# Patient Record
Sex: FEMALE | Race: BLACK OR AFRICAN AMERICAN | Employment: FULL TIME | URBAN - METROPOLITAN AREA
[De-identification: names, ages, dates, MRNs, and addresses within clinical notes are randomized per-mention and may not be internally consistent; named-entity substitution may affect disease eponyms.]

---

## 2018-09-20 PROCEDURE — 86021 WBC ANTIBODY IDENTIFICATION: CPT | Performed by: INTERNAL MEDICINE

## 2018-09-20 PROCEDURE — 88184 FLOWCYTOMETRY/ TC 1 MARKER: CPT | Performed by: INTERNAL MEDICINE

## 2018-09-20 PROCEDURE — 86803 HEPATITIS C AB TEST: CPT | Performed by: INTERNAL MEDICINE

## 2018-09-20 PROCEDURE — 88185 FLOWCYTOMETRY/TC ADD-ON: CPT | Performed by: INTERNAL MEDICINE

## 2018-09-20 PROCEDURE — 87389 HIV-1 AG W/HIV-1&-2 AB AG IA: CPT | Performed by: INTERNAL MEDICINE

## 2018-10-04 ENCOUNTER — APPOINTMENT (OUTPATIENT)
Dept: HEMATOLOGY/ONCOLOGY | Age: 63
End: 2018-10-04
Attending: INTERNAL MEDICINE
Payer: COMMERCIAL

## 2018-12-17 PROBLEM — D70.8 CHRONIC BENIGN NEUTROPENIA (HCC): Status: ACTIVE | Noted: 2018-12-17

## 2018-12-17 PROBLEM — D70.8 CHRONIC BENIGN NEUTROPENIA: Status: ACTIVE | Noted: 2018-12-17

## 2021-10-30 ENCOUNTER — HOSPITAL ENCOUNTER (EMERGENCY)
Age: 66
Discharge: HOME | End: 2021-10-30
Attending: EMERGENCY MEDICINE
Payer: COMMERCIAL

## 2021-10-30 VITALS
SYSTOLIC BLOOD PRESSURE: 147 MMHG | DIASTOLIC BLOOD PRESSURE: 80 MMHG | BODY MASS INDEX: 22.5 KG/M2 | HEART RATE: 89 BPM | OXYGEN SATURATION: 100 % | TEMPERATURE: 99 F | HEIGHT: 66 IN | RESPIRATION RATE: 16 BRPM | WEIGHT: 140 LBS

## 2021-10-30 DIAGNOSIS — T15.92XA FB EYE, LEFT, INITIAL ENCOUNTER: Primary | ICD-10-CM

## 2021-10-30 DIAGNOSIS — S05.02XA ABRASION OF LEFT CORNEA, INITIAL ENCOUNTER: ICD-10-CM

## 2021-10-30 PROCEDURE — 99283 EMERGENCY DEPT VISIT LOW MDM: CPT

## 2021-10-30 PROCEDURE — 65222 REMOVE FOREIGN BODY FROM EYE: CPT

## 2021-10-30 RX ORDER — TOBRAMYCIN 3 MG/ML
2 SOLUTION/ DROPS OPHTHALMIC EVERY 4 HOURS
Qty: 1 EACH | Refills: 0 | Status: SHIPPED | OUTPATIENT
Start: 2021-10-30

## 2021-10-30 NOTE — ED PROVIDER NOTES
Patient Seen in: [facility] Emergency Department In [place]      History   Patient presents with:   Eye Visual Problem  Laceration/Abrasion    Stated Complaint: glass in l hand and l eye after breaking a jar     Subjective:   24-year-old female who present air)       Current:/80   Pulse 89   Temp 98.5 °F (36.9 °C) (Temporal)   Resp 16   Ht 167.6 cm (5' 6\")   Wt 63.5 kg   SpO2 100%   BMI 22.60 kg/m²         Physical Exam  Vitals and nursing note reviewed.    Constitutional:       Appearance: Normal appe agreement Mike Burgos plan and disposition. [facility]      Foreign body was successfully removed from the left lower eyelid. Slit-lamp exam was done a corneal abrasion is noted. Patient was placed on tobramycin.   Patient advised Tylenol and Motrin as neede

## 2021-10-30 NOTE — ED PROVIDER NOTES
24-year-old female that was seen by the nurse practitioner. We did discuss the case and discussed care and treatment. Patient was discharged prior to my evaluation.

## 2025-03-18 ENCOUNTER — LAB ENCOUNTER (OUTPATIENT)
Dept: LAB | Age: 70
End: 2025-03-18
Attending: NURSE PRACTITIONER
Payer: MEDICARE

## 2025-03-18 ENCOUNTER — OFFICE VISIT (OUTPATIENT)
Dept: INTERNAL MEDICINE CLINIC | Facility: CLINIC | Age: 70
End: 2025-03-18
Payer: MEDICARE

## 2025-03-18 VITALS
OXYGEN SATURATION: 98 % | HEIGHT: 65.35 IN | BODY MASS INDEX: 23.48 KG/M2 | TEMPERATURE: 98 F | HEART RATE: 73 BPM | DIASTOLIC BLOOD PRESSURE: 80 MMHG | RESPIRATION RATE: 16 BRPM | SYSTOLIC BLOOD PRESSURE: 114 MMHG | WEIGHT: 142.63 LBS

## 2025-03-18 DIAGNOSIS — Z11.3 SCREENING FOR STDS (SEXUALLY TRANSMITTED DISEASES): ICD-10-CM

## 2025-03-18 DIAGNOSIS — N94.10 DYSPAREUNIA IN FEMALE: ICD-10-CM

## 2025-03-18 DIAGNOSIS — N76.1 CHRONIC VAGINITIS: Primary | ICD-10-CM

## 2025-03-18 DIAGNOSIS — L29.0 RECTAL ITCHING: ICD-10-CM

## 2025-03-18 DIAGNOSIS — N76.1 CHRONIC VAGINITIS: ICD-10-CM

## 2025-03-18 LAB
HBV CORE AB SERPL QL IA: NONREACTIVE
HBV SURFACE AG SER-ACNC: 0.18 [IU]/L
HBV SURFACE AG SERPL QL IA: NONREACTIVE
HCV AB SERPL QL IA: NONREACTIVE
T PALLIDUM AB SER QL IA: NONREACTIVE

## 2025-03-18 PROCEDURE — 87591 N.GONORRHOEAE DNA AMP PROB: CPT | Performed by: NURSE PRACTITIONER

## 2025-03-18 PROCEDURE — 86704 HEP B CORE ANTIBODY TOTAL: CPT

## 2025-03-18 PROCEDURE — 87491 CHLMYD TRACH DNA AMP PROBE: CPT | Performed by: NURSE PRACTITIONER

## 2025-03-18 PROCEDURE — 81514 NFCT DS BV&VAGINITIS DNA ALG: CPT | Performed by: NURSE PRACTITIONER

## 2025-03-18 PROCEDURE — 87340 HEPATITIS B SURFACE AG IA: CPT

## 2025-03-18 PROCEDURE — 86780 TREPONEMA PALLIDUM: CPT

## 2025-03-18 PROCEDURE — 87389 HIV-1 AG W/HIV-1&-2 AB AG IA: CPT

## 2025-03-18 PROCEDURE — 36415 COLL VENOUS BLD VENIPUNCTURE: CPT

## 2025-03-18 PROCEDURE — 99204 OFFICE O/P NEW MOD 45 MIN: CPT | Performed by: NURSE PRACTITIONER

## 2025-03-18 PROCEDURE — 86803 HEPATITIS C AB TEST: CPT

## 2025-03-18 RX ORDER — CLOTRIMAZOLE AND BETAMETHASONE DIPROPIONATE 10; .64 MG/G; MG/G
1 CREAM TOPICAL 2 TIMES DAILY PRN
Qty: 60 G | Refills: 0 | Status: SHIPPED | OUTPATIENT
Start: 2025-03-18

## 2025-03-18 RX ORDER — HYDROCORTISONE 25 MG/G
1 CREAM TOPICAL 2 TIMES DAILY
Qty: 30 G | Refills: 0 | Status: SHIPPED | OUTPATIENT
Start: 2025-03-18

## 2025-03-18 NOTE — PROGRESS NOTES
[facility]    CHIEF COMPLAINT:   Chief Complaint   Patient presents with    Vaginal Problem     Going on for a long time - months+    Extreme Itching Vaginal/Anal, Painful Sex Recently, noticed 2 Bumps around vaginal area         HISTORY OF PRESENT ILLNESS: The patient is a 70 year old year old female new to the office who presents with acute concerns. Has not seen a PCP but sees functional medicine MD, Dr. Kirill Cummins.     Diet and exercise are good. No alcohol use. No smoking. She is  and has 6 children and 6 grandchildren. She is a retired teacher.     Has had chronic vaginal itching on and off from the past 30+ years since being . However, has noticed increased vaginal itching, rectal itching, and pain during sexual intercourse from the past 2-3 months. She did notice some \"bumps\" to the vaginal area previously, none present now. Denies any vaginal discharge, pelvic pain, abdominal pain, rashes, or lesions to the genital area. Denies any constipation, diarrhea, hemorrhoids, or blood in stool. She is sexually active with her  only, no concerns for STDs per patient. Mentions that her  recently told her he gets bumps and itching on the penis area which resolves on its own. He will also be making an appointment for further workup/testing with his PCP. She has been tested for HIV in 2018 which was negative. She has not had any other testing/workup for this. Used to see gyne years ago when younger, has not seen anyone recently. She has tried home remedy such as castor oil, apple cider vinegar, and coconut oil without much relief.     Past medical, surgical, social, and family history reviewed.     Past Medical History:   History reviewed. No pertinent past medical history.     Past Surgical History:   Past Surgical History:   Procedure Laterality Date          x2    D & c         Current Medications:    Current Outpatient Medications   Medication Sig Dispense Refill     clotrimazole-betamethasone 1-0.05 % External Cream Apply 1 Application topically 2 (two) times daily as needed. 60 g 0    hydrocortisone 2.5 % External Cream Place 1 Application rectally 2 (two) times daily. 30 g 0        Allergies:    Allergies as of 03/18/2025 - Review Complete 03/18/2025   Allergen Reaction Noted    Penicillins OTHER (SEE COMMENTS) 09/20/2018       SOCIAL HISTORY:   Social History     Socioeconomic History    Marital status:      Spouse name: Not on file    Number of children: Not on file    Years of education: Not on file    Highest education level: Not on file   Occupational History    Not on file   Tobacco Use    Smoking status: Never     Passive exposure: Never    Smokeless tobacco: Never   Vaping Use    Vaping status: Never Used   Substance and Sexual Activity    Alcohol use: No    Drug use: No    Sexual activity: Yes     Partners: Male   Other Topics Concern    Caffeine Concern No    Exercise Yes    Seat Belt Yes    Special Diet Yes    Stress Concern No    Weight Concern No   Social History Narrative    Not on file     Social Drivers of Health     Food Insecurity: Not on file   Transportation Needs: Not on file   Stress: Not on file   Housing Stability: Not on file       FAMILY HISTORY:   Family History   Problem Relation Age of Onset    No Known Problems Mother     No Known Problems Father     No Known Problems Sister     No Known Problems Brother     Diabetes Maternal Grandmother     No Known Problems Maternal Grandfather     No Known Problems Paternal Grandmother     No Known Problems Paternal Grandfather        REVIEW OF SYSTEMS:  GENERAL: feels well otherwise  SKIN: denies any unusual skin lesions  EYES: denies blurred vision or double vision  HEENT: denies nasal congestion, sinus pain or ST  LUNGS: denies shortness of breath with exertion  CARDIOVASCULAR: denies chest pain on exertion  GI: denies abdominal pain, denies heartburn  : see HPI  MUSCULOSKELETAL: denies back  pain  NEURO: denies headaches  PSYCHE: denies depression or anxiety   HEMATOLOGIC: denies hx of anemia  ENDOCRINE: denies thyroid history  ALL/ASTHMA: hx of multiple \"food\" allergy per patient, no asthma    PAIN ASSESSMENT (Patient reports Pain): Yes       FALL ASSESSMENT:    Falls in the last 12 months: No    FUNCTIONAL ASSESSMENT (Able to perform all ADLs): Yes    BODY HABITUS AND NUTRITION STATUS: Body mass index is 23.47 kg/m².    PHYSICAL EXAM:   /80 (BP Location: Left arm, Patient Position: Sitting, Cuff Size: adult)   Pulse 73   Temp 98.4 °F (36.9 °C) (Temporal)   Resp 16   Ht 5' 5.35\" (1.66 m)   Wt 142 lb 9.6 oz (64.7 kg)   SpO2 98%   BMI 23.47 kg/m²  Body mass index is 23.47 kg/m².   GENERAL: well developed, well nourished, in no apparent distress  SKIN: no rashes, no suspicious lesions  HEENT: atraumatic, normocephalic  EYES: PERRLA, EOMI, conjunctiva are clear  NECK: supple, no adenopathy, no bruits, no thyroid masses.  LUNGS: clear to auscultation, no rhonchi, rales, or wheezing  CARDIO: RRR without murmur  GI: good BS's, no masses, HSM or tenderness  : introitus is normal, no discharge, cervix is pink, no adnexal masses or tenderness, no uterine enlargement or masses. No lesions/rash seen.   RECTAL: No rash or hemorrhoids seen.   MUSCULOSKELETAL: back is not tender, FROM of the back  EXTREMITIES: no cyanosis, clubbing or edema  NEURO: Oriented times three, cranial nerves are grossly intact, motor and sensory are grossly intact    Labs:   Lab Results   Component Value Date/Time    WBC 4.10 10/14/2021 04:08 PM    HGB 12.0 10/14/2021 04:08 PM     10/14/2021 04:08 PM      No results found for: \"GLU\", \"NA\", \"K\", \"CL\", \"CO2\", \"CREATSERUM\", \"CA\", \"ALB\", \"TP\", \"ALKPHO\", \"AST\", \"ALT\", \"BILT\", \"TSH\", \"T4F\"     No results found for: \"CHOLEST\", \"HDL\", \"TRIG\", \"LDL\", \"NONHDLC\"    No results found for: \"A1C\"   No results found for: \"VITD\"      IMAGING:     No results found.     ASSESSMENT AND  PLAN:   1. Chronic vaginitis  2. Dyspareunia in female  3. Screening for STDs (sexually transmitted diseases)  -Vaginitis PCR and STD testing done  -No lesions seen to swab for herpes   -Prefers to do OTC yeast medication if needed. Discussed OTC monistat  -Trial clotrimazole cream externally for itching prn   -Avoid sexual intercourse until symptoms have completely resolved   -Advised to follow up with PCP and/or gyne if symptoms persist/worsen  - Vaginitis Vaginosis PCR Panel; Future  - Hep B Core AB, Tot; Future  - HCV Antibody; Future  - HIV Ag/Ab Combo; Future  - T Pallidum Screening Cascade; Future  - Chlamydia/Gc Amplification; Future  - Hepatitis B Surface Antigen; Future  - clotrimazole-betamethasone 1-0.05 % External Cream; Apply 1 Application topically 2 (two) times daily as needed.  Dispense: 60 g; Refill: 0  - Chlamydia/Gc Amplification  - Vaginitis Vaginosis PCR Panel    4. Rectal itching  -Normal external rectal exam   -Trial Anusol hc cream BID x 7-10 days then prn  - hydrocortisone 2.5 % External Cream; Place 1 Application rectally 2 (two) times daily.  Dispense: 30 g; Refill: 0    Return for with PCP as needed.    Vaishali Cooper, APRN  3/18/2025

## 2025-03-19 LAB
BV BACTERIA DNA VAG QL NAA+PROBE: NEGATIVE
C GLABRATA DNA VAG QL NAA+PROBE: NEGATIVE
C KRUSEI DNA VAG QL NAA+PROBE: NEGATIVE
C TRACH DNA SPEC QL NAA+PROBE: NEGATIVE
CANDIDA DNA VAG QL NAA+PROBE: NEGATIVE
N GONORRHOEA DNA SPEC QL NAA+PROBE: NEGATIVE
T VAGINALIS DNA VAG QL NAA+PROBE: NEGATIVE

## 2025-07-14 ENCOUNTER — OFFICE VISIT (OUTPATIENT)
Dept: INTERNAL MEDICINE CLINIC | Facility: CLINIC | Age: 70
End: 2025-07-14
Payer: MEDICARE

## 2025-07-14 VITALS
TEMPERATURE: 98 F | WEIGHT: 132.81 LBS | DIASTOLIC BLOOD PRESSURE: 74 MMHG | RESPIRATION RATE: 16 BRPM | BODY MASS INDEX: 21.34 KG/M2 | SYSTOLIC BLOOD PRESSURE: 112 MMHG | OXYGEN SATURATION: 99 % | HEIGHT: 66 IN | HEART RATE: 82 BPM

## 2025-07-14 DIAGNOSIS — Z00.00 LABORATORY EXAMINATION ORDERED AS PART OF A COMPLETE PHYSICAL EXAMINATION: ICD-10-CM

## 2025-07-14 DIAGNOSIS — D70.8 CHRONIC BENIGN NEUTROPENIA: ICD-10-CM

## 2025-07-14 DIAGNOSIS — Z12.31 ENCOUNTER FOR SCREENING MAMMOGRAM FOR MALIGNANT NEOPLASM OF BREAST: Primary | ICD-10-CM

## 2025-07-14 DIAGNOSIS — Z13.220 LIPID SCREENING: ICD-10-CM

## 2025-07-14 DIAGNOSIS — R76.0 ABNORMAL ANTINUCLEAR ANTIBODY TITER: ICD-10-CM

## 2025-07-14 DIAGNOSIS — Z86.19 HISTORY OF HELICOBACTER PYLORI INFECTION: ICD-10-CM

## 2025-07-14 DIAGNOSIS — Z12.11 SCREEN FOR COLON CANCER: ICD-10-CM

## 2025-07-14 NOTE — PROGRESS NOTES
Stacia Jones  2/24/1955    Chief Complaint   Patient presents with    [facility]     TA RM7            The following individual(s) verbally consented to be recorded using ambient AI listening technology and understand that they can each withdraw their consent to this listening technology at any point by asking the clinician to turn off or pause the recording:    Patient name: Stacia Jones    SUBJECTIVE       History of Present Illness  Stacia Jones is a 70 year old female with autoimmune responses and histamine intolerance who presents with worsening symptoms and dietary restrictions.    She has experienced autoimmune responses and histamine intolerance for nearly 30 years, with symptoms progressively worsening over time. Her diet is severely restricted, and she has been unable to consume regular food for almost three decades. Recently, her symptoms have intensified, further limiting the variety of foods she can eat.    Her medical history includes a past H. Pylori infection diagnosed over 30 years ago, which was initially mistreated with a muscle relaxer. She has had significant digestive issues, including a year-long period of diarrhea followed by severe constipation, which was eventually resolved with chiropractic adjustments. Currently, she reports regular bowel movements.    Current symptoms include tinnitus, tingling sensations throughout the body, and phobias related to enclosed spaces such as elevators. Foods high in histamine, like citrus fruits and spinach, exacerbate her symptoms, causing increased tinnitus and tingling sensations.    She has experienced severe skin reactions, including hives and peeling skin, particularly after consuming foods like nuts and avocados.     Her diet is very limited and organic, avoiding restaurant foods and carrying her own meals when dining out with family. She has tried various dietary approaches, including a recent five-week period of consuming green smoothies and raw  vegetables, which led to weight loss and was subsequently discontinued.    Her social history includes a background in education and a history of childhood trauma, which she believes may have contributed to her digestive issues. She has a phobia of elevators and enclosed spaces, which she attributes to nervousness rather than anxiety.    Review of Systems   No f/c/chest pain or sob. No cough. No abd pain/n/v/d. No ha or dizziness. No numbness, tingling, or weakness. No other complaints today.    Current Medications[1]   Allergies[2]   Past Medical History[3]   Problem List[4]   Past Surgical History[5]   Short Social Hx on File[6]      OBJECTIVE:   /74   Pulse 82   Temp 98.4 °F (36.9 °C)   Resp 16   Ht 5' 6\" (1.676 m)   Wt 132 lb 12.8 oz (60.2 kg)   SpO2 99%   BMI 21.43 kg/m²   Constitutional: Oriented to person, place, and time. No distress.   HEENT:  Normocephalic and atraumatic.  Cardiovascular: Normal rate, regular rhythm and intact distal pulses.  No murmur, rubs or gallops.   Pulmonary/Chest: Effort normal and breath sounds normal. No respiratory distress.  Abdominal: Soft, nontender, and nondistended.  Musculoskeletal: No edema  Lymphadenopathy: No cervical adenopathy.   Neurological: No gross defecits  Skin: Skin is warm and dry. No rash.  Psychiatric: Normal mood and affect.     ASSESSMENT AND PLAN:     Assessment & Plan  Reported histamine intolerance  She has a long-standing history of histamine intolerance with worsening symptoms, including tinnitus, paresthesia, and systemic reactions after consuming foods like citrus fruits, spinach, and avocados. She follows a restricted organic diet. Mast cell activation syndrome is considered as a potential cause. She is open to a trial of antihistamines to assess symptom improvement, indicating histamine mediation.  - Conduct a trial of antihistamines to assess symptom improvement and determine histamine mediation.  - Order blood tests to evaluate for  autoimmune or rheumatologic conditions and inflammation.  - Consider referral to a gastroenterologist for further evaluation, including a possible colonoscopy, to assess for intestinal inflammation or other underlying conditions.    Reported autoimmune response  She experiences systemic reactions, including skin peeling and urticaria, after consuming certain foods. Previous evaluations ruled out lupus and leukemia. Blood tests are planned to evaluate for autoimmune or rheumatologic conditions contributing to these symptoms.  - Order blood tests to evaluate for autoimmune or rheumatologic conditions and inflammation.    Phobias and Anxiety  She has situational phobias, including claustrophobia and fear of snakes and worms, but denies general anxiety. Addressing these phobias is important for improving quality of life.    General Health Maintenance  She has not had a mammogram due to advice from a chiropractor, but a mammogram is strongly recommended due to breast cancer risk. She is on a restricted diet and advised to consider more variety for overall health and quality of life.  - Order a mammogram for breast cancer screening.  - Encourage dietary variety for overall health and quality of life.    Follow-up  Follow-up is crucial to obtain blood test results and discuss further management based on findings.  - Schedule follow-up appointment in three weeks to discuss blood test results and further management.  - Send a copy of the blood test results to Doctor Larkin.    Total time spent: 40 minutes     Return in 3-4 weeks    The patient indicates understanding of these issues and agrees to the plan.    TODAY'S ORDERS     No orders of the defined types were placed in this encounter.      Meds & Refills:  Requested Prescriptions      No prescriptions requested or ordered in this encounter       Imaging & Consults:  None    No follow-ups on file.  There are no Patient Instructions on file for this visit.    All  questions were answered and the patient agrees with the plan.     Thank you,  Mark Hernández MD       [1]   Current Outpatient Medications   Medication Sig Dispense Refill    clotrimazole-betamethasone 1-0.05 % External Cream Apply 1 Application topically 2 (two) times daily as needed. 60 g 0    hydrocortisone 2.5 % External Cream Place 1 Application rectally 2 (two) times daily. 30 g 0   [2]   Allergies  Allergen Reactions    Penicillins OTHER (SEE COMMENTS)     Pt states that she will not take this med due to son having a allergy to this medication    [3] History reviewed. No pertinent past medical history.  [4]   Patient Active Problem List  Diagnosis    Chronic benign neutropenia   [5]   Past Surgical History:  Procedure Laterality Date          x2    D & c     [6]   Social History  Socioeconomic History    Marital status:    Tobacco Use    Smoking status: Never     Passive exposure: Never    Smokeless tobacco: Never   Vaping Use    Vaping status: Never Used   Substance and Sexual Activity    Alcohol use: No    Drug use: No    Sexual activity: Yes     Partners: Male   Other Topics Concern    Caffeine Concern No    Exercise Yes    Seat Belt Yes    Special Diet Yes    Stress Concern No    Weight Concern No     Social Drivers of Health     Food Insecurity: No Food Insecurity (2025)    NCSS - Food Insecurity     Worried About Running Out of Food in the Last Year: No     Ran Out of Food in the Last Year: No   Transportation Needs: No Transportation Needs (2025)    NCSS - Transportation     Lack of Transportation: No   Housing Stability: Not At Risk (2025)    NCSS - Housing/Utilities     Has Housing: Yes     Worried About Losing Housing: No     Unable to Get Utilities: No

## 2025-07-15 ENCOUNTER — LAB ENCOUNTER (OUTPATIENT)
Dept: LAB | Age: 70
End: 2025-07-15
Attending: INTERNAL MEDICINE
Payer: MEDICARE

## 2025-07-15 DIAGNOSIS — Z00.00 LABORATORY EXAMINATION ORDERED AS PART OF A COMPLETE PHYSICAL EXAMINATION: ICD-10-CM

## 2025-07-15 DIAGNOSIS — D64.9 ANEMIA, UNSPECIFIED TYPE: ICD-10-CM

## 2025-07-15 DIAGNOSIS — Z86.19 HISTORY OF HELICOBACTER PYLORI INFECTION: ICD-10-CM

## 2025-07-15 DIAGNOSIS — Z13.220 LIPID SCREENING: ICD-10-CM

## 2025-07-15 DIAGNOSIS — R76.0 ABNORMAL ANTINUCLEAR ANTIBODY TITER: ICD-10-CM

## 2025-07-15 DIAGNOSIS — D70.8 CHRONIC BENIGN NEUTROPENIA: ICD-10-CM

## 2025-07-15 LAB
BASOPHILS # BLD AUTO: 0.03 X10(3) UL (ref 0–0.2)
BASOPHILS NFR BLD AUTO: 1.2 %
CHOLEST SERPL-MCNC: 150 MG/DL (ref ?–200)
CRP SERPL-MCNC: <0.5 MG/DL (ref ?–0.5)
EOSINOPHIL # BLD AUTO: 0.12 X10(3) UL (ref 0–0.7)
EOSINOPHIL NFR BLD AUTO: 4.6 %
ERYTHROCYTE [DISTWIDTH] IN BLOOD BY AUTOMATED COUNT: 14.6 %
ERYTHROCYTE [SEDIMENTATION RATE] IN BLOOD: 61 MM/HR (ref 0–30)
FASTING PATIENT LIPID ANSWER: YES
HCT VFR BLD AUTO: 33.2 % (ref 35–48)
HDLC SERPL-MCNC: 85 MG/DL (ref 40–59)
HGB BLD-MCNC: 10.8 G/DL (ref 12–16)
IMM GRANULOCYTES # BLD AUTO: 0 X10(3) UL (ref 0–1)
IMM GRANULOCYTES NFR BLD: 0 %
LDLC SERPL CALC-MCNC: 58 MG/DL (ref ?–100)
LYMPHOCYTES # BLD AUTO: 1.04 X10(3) UL (ref 1–4)
LYMPHOCYTES NFR BLD AUTO: 40 %
MCH RBC QN AUTO: 25.5 PG (ref 26–34)
MCHC RBC AUTO-ENTMCNC: 32.5 G/DL (ref 31–37)
MCV RBC AUTO: 78.3 FL (ref 80–100)
MONOCYTES # BLD AUTO: 0.3 X10(3) UL (ref 0.1–1)
MONOCYTES NFR BLD AUTO: 11.5 %
NEUTROPHILS # BLD AUTO: 1.11 X10 (3) UL (ref 1.5–7.7)
NEUTROPHILS # BLD AUTO: 1.11 X10(3) UL (ref 1.5–7.7)
NEUTROPHILS NFR BLD AUTO: 42.7 %
NONHDLC SERPL-MCNC: 65 MG/DL (ref ?–130)
PLATELET # BLD AUTO: 232 10(3)UL (ref 150–450)
RBC # BLD AUTO: 4.24 X10(6)UL (ref 3.8–5.3)
RHEUMATOID FACT SERPL-ACNC: <3.5 IU/ML (ref ?–14)
TRIGL SERPL-MCNC: 20 MG/DL (ref 30–149)
URATE SERPL-MCNC: 3.8 MG/DL (ref 3.1–7.8)
VLDLC SERPL CALC-MCNC: 3 MG/DL (ref 0–30)
WBC # BLD AUTO: 2.6 X10(3) UL (ref 4–11)

## 2025-07-15 PROCEDURE — 86038 ANTINUCLEAR ANTIBODIES: CPT

## 2025-07-15 PROCEDURE — 86200 CCP ANTIBODY: CPT

## 2025-07-15 PROCEDURE — 87338 HPYLORI STOOL AG IA: CPT

## 2025-07-15 PROCEDURE — 86225 DNA ANTIBODY NATIVE: CPT

## 2025-07-15 PROCEDURE — 80061 LIPID PANEL: CPT

## 2025-07-15 PROCEDURE — 85045 AUTOMATED RETICULOCYTE COUNT: CPT

## 2025-07-15 PROCEDURE — 85652 RBC SED RATE AUTOMATED: CPT

## 2025-07-15 PROCEDURE — 86140 C-REACTIVE PROTEIN: CPT

## 2025-07-15 PROCEDURE — 85025 COMPLETE CBC W/AUTO DIFF WBC: CPT

## 2025-07-15 PROCEDURE — 84550 ASSAY OF BLOOD/URIC ACID: CPT

## 2025-07-15 PROCEDURE — 83540 ASSAY OF IRON: CPT

## 2025-07-15 PROCEDURE — 86431 RHEUMATOID FACTOR QUANT: CPT

## 2025-07-15 PROCEDURE — 36415 COLL VENOUS BLD VENIPUNCTURE: CPT

## 2025-07-15 PROCEDURE — 82728 ASSAY OF FERRITIN: CPT

## 2025-07-15 PROCEDURE — 83550 IRON BINDING TEST: CPT

## 2025-07-16 LAB
CCP IGG SERPL-ACNC: 1.9 U/ML (ref 0–6.9)
DEPRECATED HBV CORE AB SER IA-ACNC: 69 NG/ML (ref 50–306)
DSDNA IGG SERPL IA-ACNC: 1.2 IU/ML (ref ?–10)
ENA AB SER QL IA: 0.3 UG/L (ref ?–0.7)
ENA AB SER QL IA: NEGATIVE
HGB RETIC QN AUTO: 29.3 PG (ref 28.2–36.6)
IMM RETICS NFR: 0.05 RATIO (ref 0.1–0.3)
IRON SATN MFR SERPL: 17 % (ref 15–50)
IRON SERPL-MCNC: 52 UG/DL (ref 50–170)
RETICS # AUTO: 27.9 X10(3) UL (ref 22.5–147.5)
RETICS/RBC NFR AUTO: 0.7 % (ref 0.5–2.5)
TOTAL IRON BINDING CAPACITY: 305 UG/DL (ref 250–425)
TRANSFERRIN SERPL-MCNC: 238 MG/DL (ref 250–380)

## 2025-07-17 LAB — H PYLORI AG STL QL IA: NEGATIVE

## 2025-08-08 ENCOUNTER — OFFICE VISIT (OUTPATIENT)
Dept: INTERNAL MEDICINE CLINIC | Facility: CLINIC | Age: 70
End: 2025-08-08

## 2025-08-08 VITALS
SYSTOLIC BLOOD PRESSURE: 122 MMHG | OXYGEN SATURATION: 98 % | WEIGHT: 131 LBS | RESPIRATION RATE: 16 BRPM | BODY MASS INDEX: 21.05 KG/M2 | DIASTOLIC BLOOD PRESSURE: 78 MMHG | TEMPERATURE: 97 F | HEART RATE: 78 BPM | HEIGHT: 66 IN

## 2025-08-08 DIAGNOSIS — K90.49 GASTROINTESTINAL INTOLERANCE TO FOODS: Primary | ICD-10-CM

## 2025-08-08 DIAGNOSIS — R70.0 ESR RAISED: ICD-10-CM

## 2025-08-08 DIAGNOSIS — D50.9 MICROCYTIC ANEMIA: ICD-10-CM

## 2025-08-08 PROCEDURE — G2211 COMPLEX E/M VISIT ADD ON: HCPCS | Performed by: INTERNAL MEDICINE

## 2025-08-08 PROCEDURE — 99214 OFFICE O/P EST MOD 30 MIN: CPT | Performed by: INTERNAL MEDICINE
